# Patient Record
Sex: FEMALE | Race: WHITE | URBAN - METROPOLITAN AREA
[De-identification: names, ages, dates, MRNs, and addresses within clinical notes are randomized per-mention and may not be internally consistent; named-entity substitution may affect disease eponyms.]

---

## 2021-09-01 ENCOUNTER — OFFICE VISIT (OUTPATIENT)
Dept: URBAN - METROPOLITAN AREA CLINIC 115 | Facility: CLINIC | Age: 71
End: 2021-09-01
Payer: COMMERCIAL

## 2021-09-01 ENCOUNTER — WEB ENCOUNTER (OUTPATIENT)
Dept: URBAN - METROPOLITAN AREA CLINIC 115 | Facility: CLINIC | Age: 71
End: 2021-09-01

## 2021-09-01 ENCOUNTER — LAB OUTSIDE AN ENCOUNTER (OUTPATIENT)
Dept: URBAN - METROPOLITAN AREA CLINIC 115 | Facility: CLINIC | Age: 71
End: 2021-09-01

## 2021-09-01 DIAGNOSIS — R63.0 POOR APPETITE: ICD-10-CM

## 2021-09-01 DIAGNOSIS — K59.09 CHANGE IN BOWEL MOVEMENTS INTERMITTENT CONSTIPATION. URGENCY IN THE MORNING.: ICD-10-CM

## 2021-09-01 DIAGNOSIS — R11.0 NAUSEA: ICD-10-CM

## 2021-09-01 DIAGNOSIS — R63.4 WEIGHT LOSS: ICD-10-CM

## 2021-09-01 PROCEDURE — 99244 OFF/OP CNSLTJ NEW/EST MOD 40: CPT | Performed by: INTERNAL MEDICINE

## 2021-09-01 PROCEDURE — 99204 OFFICE O/P NEW MOD 45 MIN: CPT | Performed by: INTERNAL MEDICINE

## 2021-09-01 RX ORDER — POLYETHYLENE GLYCOL 3350 17 G/17G
AS DIRECTED PRIOR TO COLONOSCOPY POWDER, FOR SOLUTION ORAL ONCE
Qty: 238  GRAM | Refills: 0 | OUTPATIENT
Start: 2021-09-01 | End: 2021-09-02

## 2021-09-01 NOTE — HPI-TODAY'S VISIT:
Patient was referred by Missy David for evaluation of nausea and weight loss.  A copy of this document will be sent to the referring physician.  Ms. Rahman is a 71-year-old  female came into the office with complaints of having significant nausea and weight loss about 31 pounds since March.  Patient reports having history of significant high blood pressure has been on multiple her high blood pressure medications but none of them are new medications.  Patient stated all of a sudden she started having nausea even with the sight and smell of food.  And she not eating food and which contributed to 30 was 31 pound weight loss.  Patient also reports to having excessive hair tiredness and fatigue and she had been sleeping when she gets home.  She still works at the [employer].  She has not had a noscapine.  She also reports occasional indigestion.  Denies any chest pain shortness of breath.  She was seen by cardiologist recently and was told that her her medications that she needs to continue and did not require any dose adjustment.  Patient stated her bowel movements have been irregular because she is not eating very well bowel movements once in 2 to 3 days.  She denies rectal bleeding .

## 2021-09-03 ENCOUNTER — TELEPHONE ENCOUNTER (OUTPATIENT)
Dept: URBAN - METROPOLITAN AREA CLINIC 92 | Facility: CLINIC | Age: 71
End: 2021-09-03

## 2021-09-04 ENCOUNTER — OUT OF OFFICE VISIT (OUTPATIENT)
Dept: URBAN - METROPOLITAN AREA MEDICAL CENTER 31 | Facility: MEDICAL CENTER | Age: 71
End: 2021-09-04
Payer: COMMERCIAL

## 2021-09-04 DIAGNOSIS — K31.89 ACQUIRED DEFORMITY OF DUODENUM: ICD-10-CM

## 2021-09-04 DIAGNOSIS — D12.0 ADENOMA OF CECUM: ICD-10-CM

## 2021-09-04 DIAGNOSIS — R63.4 ABNORMAL INTENTIONAL WEIGHT LOSS: ICD-10-CM

## 2021-09-04 DIAGNOSIS — I10 ACCELERATED ESSENTIAL HYPERTENSION: ICD-10-CM

## 2021-09-04 DIAGNOSIS — D12.4 ADENOMA OF DESCENDING COLON: ICD-10-CM

## 2021-09-04 DIAGNOSIS — D50.9 ANEMIA: ICD-10-CM

## 2021-09-04 DIAGNOSIS — K29.60 ADENOPAPILLOMATOSIS GASTRICA: ICD-10-CM

## 2021-09-04 PROBLEM — 14760008: Status: ACTIVE | Noted: 2021-09-01

## 2021-09-04 PROBLEM — 64379006: Status: ACTIVE | Noted: 2021-09-01

## 2021-09-04 PROCEDURE — 45380 COLONOSCOPY AND BIOPSY: CPT | Performed by: INTERNAL MEDICINE

## 2021-09-04 PROCEDURE — 43239 EGD BIOPSY SINGLE/MULTIPLE: CPT | Performed by: INTERNAL MEDICINE

## 2021-09-04 PROCEDURE — G8427 DOCREV CUR MEDS BY ELIG CLIN: HCPCS | Performed by: INTERNAL MEDICINE

## 2021-09-04 PROCEDURE — 45385 COLONOSCOPY W/LESION REMOVAL: CPT | Performed by: INTERNAL MEDICINE

## 2021-09-04 PROCEDURE — 99222 1ST HOSP IP/OBS MODERATE 55: CPT | Performed by: INTERNAL MEDICINE

## 2021-09-20 ENCOUNTER — OFFICE VISIT (OUTPATIENT)
Dept: URBAN - METROPOLITAN AREA CLINIC 115 | Facility: CLINIC | Age: 71
End: 2021-09-20
Payer: COMMERCIAL

## 2021-09-20 ENCOUNTER — DASHBOARD ENCOUNTERS (OUTPATIENT)
Age: 71
End: 2021-09-20

## 2021-09-20 ENCOUNTER — LAB OUTSIDE AN ENCOUNTER (OUTPATIENT)
Dept: URBAN - METROPOLITAN AREA CLINIC 115 | Facility: CLINIC | Age: 71
End: 2021-09-20

## 2021-09-20 VITALS
BODY MASS INDEX: 23.85 KG/M2 | HEART RATE: 91 BPM | WEIGHT: 134.6 LBS | TEMPERATURE: 97.5 F | DIASTOLIC BLOOD PRESSURE: 99 MMHG | HEIGHT: 63 IN | SYSTOLIC BLOOD PRESSURE: 188 MMHG

## 2021-09-20 DIAGNOSIS — K57.90 DIVERTICULOSIS: ICD-10-CM

## 2021-09-20 DIAGNOSIS — R63.4 WEIGHT LOSS: ICD-10-CM

## 2021-09-20 DIAGNOSIS — R21 RASH: ICD-10-CM

## 2021-09-20 DIAGNOSIS — K25.3 ACUTE GASTRIC ULCER WITHOUT HEMORRHAGE OR PERFORATION: ICD-10-CM

## 2021-09-20 DIAGNOSIS — D50.9 MICROCYTIC ANEMIA: ICD-10-CM

## 2021-09-20 DIAGNOSIS — D12.6 ADENOMATOUS POLYP OF COLON, UNSPECIFIED PART OF COLON: ICD-10-CM

## 2021-09-20 PROBLEM — 397881000: Status: ACTIVE | Noted: 2021-09-20

## 2021-09-20 PROBLEM — 271807003: Status: ACTIVE | Noted: 2021-09-20

## 2021-09-20 PROBLEM — 89362005: Status: ACTIVE | Noted: 2021-09-01

## 2021-09-20 PROBLEM — 428054006: Status: ACTIVE | Noted: 2021-09-20

## 2021-09-20 PROCEDURE — 99214 OFFICE O/P EST MOD 30 MIN: CPT | Performed by: INTERNAL MEDICINE

## 2021-09-20 RX ORDER — ONDANSETRON 4 MG/1
1 TABLET ON THE TONGUE AND ALLOW TO DISSOLVE TABLET, ORALLY DISINTEGRATING ORAL ONCE A DAY
Status: ACTIVE | COMMUNITY

## 2021-09-20 RX ORDER — FAMOTIDINE 20 MG/1
1 TABLET AT BEDTIME AS NEEDED TABLET, FILM COATED ORAL ONCE A DAY
Qty: 30 | OUTPATIENT
Start: 2021-09-20

## 2021-09-20 RX ORDER — METOPROLOL TARTRATE 100 MG/1
1 TABLET WITH FOOD TABLET, FILM COATED ORAL TWICE A DAY
Status: ACTIVE | COMMUNITY

## 2021-09-20 RX ORDER — PANTOPRAZOLE SODIUM 40 MG/1
1 TABLET TABLET, DELAYED RELEASE ORAL ONCE A DAY
Status: ACTIVE | COMMUNITY

## 2021-09-20 RX ORDER — FERROUS SULFATE 324(65)MG
1 TABLET TABLET, DELAYED RELEASE (ENTERIC COATED) ORAL TWICE A DAY
Qty: 60 TABLET | Refills: 2 | OUTPATIENT
Start: 2021-09-20

## 2021-09-20 NOTE — HPI-TODAY'S VISIT:
WAS ALWAYS HEALTH,   6TH WENT TO ER, WENT FOR BLOOD TRANSFUSION, HAD EGD/COLON  WAS SEEN WITH DR REED, TIRED LOST APPETITE. NOTHING SMELL OR TASTE GOOD  SLEEP ALL THE TIME   NOT ON ANY IRON PILL  LAST 3-4 DAYS HAS RASH AND WAS ON PANTOPRAZOLE. AND HAD CONTINUED PANTOPRAZOLE  WAS ON NSAIDS PRIOR TO THAT.

## 2021-09-20 NOTE — HPI-OTHER HISTORIES
ER records have been reviewed.  Her creatinine is 1.87 hemoglobin 9.7 MCV is 82 platelet is normal liver functions are normal reticulocyte count is normal ferritin is 22 iron is 185 hemoglobin on 9 3 it was 5.6 on 9 3 her LFTs were normal BUN was 26.  Her hemoglobin on 818 was 11.7 vitamin D was low her hemoglobin on 4/4/2019 was 13.1 and creatinine that time was 0.66.  Hep C has been - 1/2017 patient had a CT scan of abdomen on 9/20/2021 which was normal a unremarkable moderate size hiatal hernia and diverticulosis this was done without IV contrast and a limited study she had an endoscopy done the biopsy were from the stomach was normal small bowel duodenal mucosa was normal colon was showing a cecum polyp and ascending colon polyp it was done at [facility] she was taking NSAIDs prior to that.  Her discharge diagnosis was gastric ulcer colon polyp renal failure hyponatremia paroxysmal atrial fibrillation patient had 30 pound weight loss.  Patient takes 4-5 Aleve prior to that patient had a severe microcytic anemia EGD was showing clean-based ulcers in the gastric antrum there was 1 cm polyp in the cecum and 1 small polyp in cecum and one descending colon polyp colonoscopy was done by Dr. Fitzgerald EGD was done on 9 5 showed normal esophagus medium hiatal hernia clean-based ulcer in the antrum now the H. pylori biopsies negative

## 2021-09-21 ENCOUNTER — TELEPHONE ENCOUNTER (OUTPATIENT)
Dept: URBAN - METROPOLITAN AREA CLINIC 115 | Facility: CLINIC | Age: 71
End: 2021-09-21

## 2021-09-21 ENCOUNTER — TELEPHONE ENCOUNTER (OUTPATIENT)
Dept: URBAN - METROPOLITAN AREA CLINIC 92 | Facility: CLINIC | Age: 71
End: 2021-09-21

## 2021-09-21 LAB
A/G RATIO: 1.2
ALBUMIN: 3.5
ALKALINE PHOSPHATASE: 51
ALT (SGPT): 8
AST (SGOT): 19
BASO (ABSOLUTE): 0
BASOS: 1
BILIRUBIN, TOTAL: 0.6
BUN/CREATININE RATIO: 11
BUN: 30
CALCIUM: 8.9
CARBON DIOXIDE, TOTAL: 21
CHLORIDE: 102
CREATININE: 2.74
EGFR IF AFRICN AM: 20
EGFR IF NONAFRICN AM: 17
EOS (ABSOLUTE): 0.1
EOS: 2
GLOBULIN, TOTAL: 2.9
GLUCOSE: 114
HEMATOCRIT: 29
HEMATOLOGY COMMENTS:: (no result)
HEMOGLOBIN: 9.3
IMMATURE CELLS: (no result)
IMMATURE GRANS (ABS): 0
IMMATURE GRANULOCYTES: 1
LYMPHS (ABSOLUTE): 1.1
LYMPHS: 14
MCH: 26.5
MCHC: 32.1
MCV: 83
MONOCYTES(ABSOLUTE): 0.5
MONOCYTES: 7
NEUTROPHILS (ABSOLUTE): 5.6
NEUTROPHILS: 75
NRBC: (no result)
PLATELETS: 330
POTASSIUM: 3.6
PROTEIN, TOTAL: 6.4
RBC: 3.51
RDW: 18.1
SODIUM: 138
WBC: 7.4

## 2021-09-22 LAB — OCCULT BLOOD, FECAL, IA: NEGATIVE

## 2021-09-27 PROBLEM — 234349007: Status: ACTIVE | Noted: 2021-09-20

## 2021-09-27 PROBLEM — 67964002: Status: ACTIVE | Noted: 2021-09-20

## 2021-10-04 ENCOUNTER — TELEPHONE ENCOUNTER (OUTPATIENT)
Dept: URBAN - METROPOLITAN AREA CLINIC 115 | Facility: CLINIC | Age: 71
End: 2021-10-04

## 2021-10-13 LAB
FERRITIN, SERUM: 107
IRON BIND.CAP.(TIBC): 312
IRON SATURATION: 11
IRON: 33
UIBC: 279
WRITTEN AUTHORIZATION: (no result)

## 2021-10-15 ENCOUNTER — ERX REFILL RESPONSE (OUTPATIENT)
Dept: URBAN - METROPOLITAN AREA CLINIC 115 | Facility: CLINIC | Age: 71
End: 2021-10-15

## 2021-10-15 RX ORDER — FERROUS SULFATE TAB EC 324 MG (65 MG FE EQUIVALENT) 324 (65 FE) MG
TAKE 1 TABLET BY MOUTH TWICE A DAY TABLET DELAYED RESPONSE ORAL
Qty: 60 TABLET | Refills: 3 | OUTPATIENT

## 2021-10-15 RX ORDER — FAMOTIDINE 20 MG/1
1 TABLET AT BEDTIME AS NEEDED TABLET, FILM COATED ORAL ONCE A DAY
Qty: 30 | OUTPATIENT

## 2021-10-15 RX ORDER — FERROUS SULFATE 324(65)MG
1 TABLET TABLET, DELAYED RELEASE (ENTERIC COATED) ORAL TWICE A DAY
Qty: 60 TABLET | Refills: 2 | OUTPATIENT

## 2021-10-15 RX ORDER — FAMOTIDINE 20 MG/1
TAKE 1 TABLET BY MOUTH EVERY DAY AT BEDTIME AS NEEDED TABLET, FILM COATED ORAL
Qty: 30 TABLET | Refills: 4 | OUTPATIENT

## 2021-10-19 ENCOUNTER — OFFICE VISIT (OUTPATIENT)
Dept: URBAN - METROPOLITAN AREA SURGERY CENTER 13 | Facility: SURGERY CENTER | Age: 71
End: 2021-10-19

## 2021-10-19 ENCOUNTER — TELEPHONE ENCOUNTER (OUTPATIENT)
Dept: URBAN - METROPOLITAN AREA CLINIC 92 | Facility: CLINIC | Age: 71
End: 2021-10-19

## 2021-10-25 ENCOUNTER — OFFICE VISIT (OUTPATIENT)
Dept: URBAN - METROPOLITAN AREA MEDICAL CENTER 31 | Facility: MEDICAL CENTER | Age: 71
End: 2021-10-25

## 2021-11-22 ENCOUNTER — LAB OUTSIDE AN ENCOUNTER (OUTPATIENT)
Dept: URBAN - METROPOLITAN AREA CLINIC 115 | Facility: CLINIC | Age: 71
End: 2021-11-22

## 2022-01-18 ENCOUNTER — TELEPHONE ENCOUNTER (OUTPATIENT)
Dept: URBAN - METROPOLITAN AREA CLINIC 115 | Facility: CLINIC | Age: 72
End: 2022-01-18

## 2022-02-03 ENCOUNTER — ERX REFILL RESPONSE (OUTPATIENT)
Dept: URBAN - METROPOLITAN AREA CLINIC 115 | Facility: CLINIC | Age: 72
End: 2022-02-03

## 2022-02-03 RX ORDER — FAMOTIDINE 20 MG/1
TAKE 1 TABLET BY MOUTH EVERY DAY AT BEDTIME AS NEEDED TABLET, FILM COATED ORAL
Qty: 30 TABLET | Refills: 4 | OUTPATIENT

## 2025-04-09 NOTE — PHYSICAL EXAM CARDIOVASCULAR:
no edema,  no murmurs,  regular rate and rhythm , no edema. Previously Declined (within the last year)